# Patient Record
Sex: FEMALE | Race: BLACK OR AFRICAN AMERICAN | NOT HISPANIC OR LATINO | URBAN - NONMETROPOLITAN AREA
[De-identification: names, ages, dates, MRNs, and addresses within clinical notes are randomized per-mention and may not be internally consistent; named-entity substitution may affect disease eponyms.]

---

## 2017-07-06 PROBLEM — H52.4: Noted: 2019-12-12

## 2017-07-06 PROBLEM — H52.223: Noted: 2019-12-12

## 2017-07-06 PROBLEM — H04.123: Noted: 2017-07-06

## 2017-07-06 PROBLEM — H52.13: Noted: 2017-07-06

## 2019-12-12 ENCOUNTER — IMPORTED ENCOUNTER (OUTPATIENT)
Dept: URBAN - NONMETROPOLITAN AREA CLINIC 1 | Facility: CLINIC | Age: 49
End: 2019-12-12

## 2019-12-12 ENCOUNTER — PREPPED CHART (OUTPATIENT)
Dept: RURAL CLINIC 2 | Facility: CLINIC | Age: 49
End: 2019-12-12

## 2019-12-12 PROCEDURE — 92002 INTRM OPH EXAM NEW PATIENT: CPT

## 2019-12-12 PROCEDURE — 92015 DETERMINE REFRACTIVE STATE: CPT

## 2019-12-12 NOTE — PATIENT DISCUSSION
Myopia-Discussed diagnosis with patient. -Explained that people who are myopic are at a higher risk for developing RD/RT and reviewed associated S&S.-Pt to contact our office if symptoms develop. Updated spec Rx given. Recommend lens that will provide comfort as well as protect safety and health of eyes. Astigmatism-Discussed diagnosis with patient. Presbyopia-Discussed diagnosis with patient. Dry EyesRecommend Tears OU PRN

## 2022-02-21 ENCOUNTER — ESTABLISHED PATIENT (OUTPATIENT)
Dept: RURAL CLINIC 2 | Facility: CLINIC | Age: 52
End: 2022-02-21

## 2022-02-21 DIAGNOSIS — H52.13: ICD-10-CM

## 2022-02-21 DIAGNOSIS — H52.4: ICD-10-CM

## 2022-02-21 PROCEDURE — 92015 DETERMINE REFRACTIVE STATE: CPT

## 2022-02-21 PROCEDURE — 92014 COMPRE OPH EXAM EST PT 1/>: CPT

## 2022-02-21 ASSESSMENT — TONOMETRY
OS_IOP_MMHG: 18
OD_IOP_MMHG: 18

## 2022-02-21 ASSESSMENT — VISUAL ACUITY
OS_CC: 20/20
OD_CC: 20/20
OD_CC: 20/20
OS_CC: 20/20-1

## 2022-04-09 ASSESSMENT — VISUAL ACUITY
OS_SC: 20/50-1
OU_CC: 20/25-1
OD_SC: 20/40+

## 2022-04-09 ASSESSMENT — TONOMETRY
OD_IOP_MMHG: 21
OS_IOP_MMHG: 21

## 2023-04-04 ENCOUNTER — ESTABLISHED PATIENT (OUTPATIENT)
Dept: RURAL CLINIC 2 | Facility: CLINIC | Age: 53
End: 2023-04-04

## 2023-04-04 DIAGNOSIS — H52.4: ICD-10-CM

## 2023-04-04 DIAGNOSIS — H25.813: ICD-10-CM

## 2023-04-04 DIAGNOSIS — H16.223: ICD-10-CM

## 2023-04-04 DIAGNOSIS — H52.13: ICD-10-CM

## 2023-04-04 DIAGNOSIS — H52.223: ICD-10-CM

## 2023-04-04 PROCEDURE — 92015 DETERMINE REFRACTIVE STATE: CPT

## 2023-04-04 PROCEDURE — 92014 COMPRE OPH EXAM EST PT 1/>: CPT

## 2023-04-04 ASSESSMENT — TONOMETRY
OD_IOP_MMHG: 17
OS_IOP_MMHG: 17

## 2023-04-04 ASSESSMENT — VISUAL ACUITY
OS_CC: 20/20
OD_CC: 20/20-1

## 2024-04-05 ENCOUNTER — ESTABLISHED PATIENT (OUTPATIENT)
Dept: RURAL CLINIC 2 | Facility: CLINIC | Age: 54
End: 2024-04-05

## 2024-04-05 DIAGNOSIS — H52.223: ICD-10-CM

## 2024-04-05 DIAGNOSIS — H16.223: ICD-10-CM

## 2024-04-05 DIAGNOSIS — H52.4: ICD-10-CM

## 2024-04-05 DIAGNOSIS — H25.813: ICD-10-CM

## 2024-04-05 DIAGNOSIS — H52.13: ICD-10-CM

## 2024-04-05 PROCEDURE — 92015 DETERMINE REFRACTIVE STATE: CPT

## 2024-04-05 PROCEDURE — 92014 COMPRE OPH EXAM EST PT 1/>: CPT

## 2024-04-05 ASSESSMENT — VISUAL ACUITY
OS_CC: 20/20
OD_CC: 20/20

## 2024-04-05 ASSESSMENT — TONOMETRY
OD_IOP_MMHG: 18
OS_IOP_MMHG: 18

## 2025-03-31 ENCOUNTER — COMPREHENSIVE EXAM (OUTPATIENT)
Age: 55
End: 2025-03-31

## 2025-03-31 DIAGNOSIS — H52.223: ICD-10-CM

## 2025-03-31 DIAGNOSIS — H16.223: ICD-10-CM

## 2025-03-31 DIAGNOSIS — H52.4: ICD-10-CM

## 2025-03-31 DIAGNOSIS — H52.13: ICD-10-CM

## 2025-03-31 DIAGNOSIS — H25.813: ICD-10-CM

## 2025-03-31 PROCEDURE — 92012 INTRM OPH EXAM EST PATIENT: CPT

## 2025-03-31 PROCEDURE — 92015 DETERMINE REFRACTIVE STATE: CPT
